# Patient Record
Sex: MALE | Race: BLACK OR AFRICAN AMERICAN | NOT HISPANIC OR LATINO | URBAN - METROPOLITAN AREA
[De-identification: names, ages, dates, MRNs, and addresses within clinical notes are randomized per-mention and may not be internally consistent; named-entity substitution may affect disease eponyms.]

---

## 2017-01-26 ENCOUNTER — OUTPATIENT (OUTPATIENT)
Dept: OUTPATIENT SERVICES | Facility: HOSPITAL | Age: 32
LOS: 1 days | Discharge: HOME | End: 2017-01-26

## 2017-06-27 DIAGNOSIS — E66.9 OBESITY, UNSPECIFIED: ICD-10-CM
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
## 2017-06-27 DIAGNOSIS — S93.324A DISLOCATION OF TARSOMETATARSAL JOINT OF RIGHT FOOT, INITIAL ENCOUNTER: ICD-10-CM
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
## 2017-06-27 DIAGNOSIS — I10 ESSENTIAL (PRIMARY) HYPERTENSION: ICD-10-CM
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
## 2018-06-01 ENCOUNTER — TRANSCRIPTION ENCOUNTER (OUTPATIENT)
Age: 33
End: 2018-06-01

## 2019-12-22 ENCOUNTER — TRANSCRIPTION ENCOUNTER (OUTPATIENT)
Age: 34
End: 2019-12-22

## 2022-08-11 PROBLEM — Z00.00 ENCOUNTER FOR PREVENTIVE HEALTH EXAMINATION: Status: ACTIVE | Noted: 2022-08-11

## 2022-09-20 ENCOUNTER — APPOINTMENT (OUTPATIENT)
Dept: ORTHOPEDIC SURGERY | Facility: CLINIC | Age: 37
End: 2022-09-20

## 2022-12-10 ENCOUNTER — EMERGENCY (EMERGENCY)
Facility: HOSPITAL | Age: 37
LOS: 0 days | Discharge: HOME | End: 2022-12-10
Attending: EMERGENCY MEDICINE | Admitting: EMERGENCY MEDICINE

## 2022-12-10 VITALS
RESPIRATION RATE: 18 BRPM | SYSTOLIC BLOOD PRESSURE: 152 MMHG | OXYGEN SATURATION: 99 % | HEART RATE: 72 BPM | DIASTOLIC BLOOD PRESSURE: 82 MMHG

## 2022-12-10 VITALS
SYSTOLIC BLOOD PRESSURE: 170 MMHG | OXYGEN SATURATION: 98 % | RESPIRATION RATE: 18 BRPM | TEMPERATURE: 98 F | HEART RATE: 85 BPM | WEIGHT: 251.11 LBS | DIASTOLIC BLOOD PRESSURE: 98 MMHG

## 2022-12-10 DIAGNOSIS — Z87.440 PERSONAL HISTORY OF URINARY (TRACT) INFECTIONS: ICD-10-CM

## 2022-12-10 DIAGNOSIS — R68.2 DRY MOUTH, UNSPECIFIED: ICD-10-CM

## 2022-12-10 PROCEDURE — 99282 EMERGENCY DEPT VISIT SF MDM: CPT

## 2022-12-10 RX ORDER — SALIVA SUBSTITUTE COMB NO.11 351 MG
1 POWDER IN PACKET (EA) MUCOUS MEMBRANE
Qty: 59 | Refills: 0
Start: 2022-12-10 | End: 2022-12-10

## 2022-12-10 NOTE — ED PROVIDER NOTE - NS ED ATTENDING STATEMENT MOD
This was a shared visit with the DUSTIN. I reviewed and verified the documentation and independently performed the documented:

## 2022-12-10 NOTE — ED PROVIDER NOTE - PHYSICAL EXAMINATION
VITAL SIGNS: I have reviewed nursing notes and confirm.  CONSTITUTIONAL: Patient is in no acute distress.  SKIN: Skin exam is warm and dry, no acute rash.  HEAD: Normocephalic; atraumatic.  EYES: PERRL, EOM intact; conjunctiva and sclera clear.  ENT: No nasal discharge; airway clear.   NECK: Supple; non tender.  CARD: S1, S2 normal; no murmurs, gallops, or rubs. Regular rate and rhythm.  RESP: Clear to auscultation bilaterally. No wheezes, rales or rhonchi.  ABD: Normal bowel sounds; soft; non-distended; non-tender.   EXT: Normal ROM. No edema.  LYMPH: No acute cervical adenopathy.  NEURO: Alert, oriented. Grossly unremarkable. No focal deficits.  PSYCH: Cooperative, appropriate. VITAL SIGNS: I have reviewed nursing notes and confirm.  CONSTITUTIONAL: Patient is in no acute distress.  SKIN: Skin exam is warm and dry, no acute rash.  HEAD: Normocephalic; atraumatic.  EYES: PERRL, EOM intact; conjunctiva and sclera clear.  ENT: +Dry, erythematous mucous membranes. No nasal discharge; airway clear.   NECK: Supple; non tender.  CARD: S1, S2 normal; no murmurs, gallops, or rubs. Regular rate and rhythm.  RESP: Clear to auscultation bilaterally. No wheezes, rales or rhonchi.  ABD: Normal bowel sounds; soft; non-distended; non-tender.   EXT: Normal ROM. No edema.  LYMPH: No acute cervical adenopathy.  NEURO: Alert, oriented. Grossly unremarkable. No focal deficits.  PSYCH: Cooperative, appropriate.

## 2022-12-10 NOTE — ED PROVIDER NOTE - NSFOLLOWUPINSTRUCTIONS_ED_ALL_ED_FT
Dry Mouth    AMBULATORY CARE:    Dry mouth, or xerostomia, is a lack of saliva (spit). Saliva helps protect your teeth from decay and your mouth from bacterial infection. Saliva also helps you chew, swallow, and digest food. Dry mouth happens when your saliva glands are not working properly. This causes a decrease in the amount of saliva your mouth produces.     Other common signs and symptoms:   •Dry, sticky mouth      •Thick or stringy saliva      •Scratchy, burning, or tingling feeling on your tongue      •Chapped, cracked lips or corners of your mouth      •Trouble talking, chewing, or swallowing      •Thirst or bad breath      •Hoarse voice or dry throat      •Sores on your mouth or tongue      •Change in taste      Seek care immediately if:   •You have trouble swallowing.      •Your mouth, face, or neck are swollen.      •You have trouble opening your mouth.      Call your doctor if:   •You have a fever.      •You have tooth pain.      •Your gums are irritated, painful, or bleed.      •Your symptoms do not get better, or they get worse.      •You have questions or concerns about your condition or care.      Treatment may include medicines to increase your saliva production. You may also need saliva substitutes to help keep your mouth moist. Your healthcare provider may change your medicine if it is causing your dry mouth.     Manage your symptoms:   •Drink liquids as directed. You may need to drink more water than usual. It may help to sip small amounts throughout the day. This will help keep your mouth moist. Do not drink caffeine or alcohol. Do not drink acidic juices such as tomato, orange, or grapefruit.      •Chew sugarless gum or suck on sugar-free candy. This will help increase saliva production.      •Rinse your mouth 4 times each day. Rinse after every meal. Use a mixture of salt and baking soda. Mix ½ teaspoon of salt and ½ teaspoon of baking soda in 1 cup of warm water. Swish in your mouth and spit out.       •Brush at least twice each day and floss your teeth. Use over-the-counter mouthrinses for dry mouth. Do not use mouthrinses that have alcohol.      •Use a cool mist humidifier. A humidifier will increase air moisture in your home. This may help moisten your mouth, especially at night.      •Do not smoke. Tobacco products can dry out your mouth. Do not use e-cigarettes or smokeless tobacco in place of cigarettes or to help you quit. They still contain nicotine. Ask your healthcare provider for information if you currently smoke and need help to quit.      Follow up with your doctor as directed: Write down your questions so you remember to ask them during your visits. You can take peptin gel 0.5% over the counter for dry mouth.     Dry Mouth    AMBULATORY CARE:    Dry mouth, or xerostomia, is a lack of saliva (spit). Saliva helps protect your teeth from decay and your mouth from bacterial infection. Saliva also helps you chew, swallow, and digest food. Dry mouth happens when your saliva glands are not working properly. This causes a decrease in the amount of saliva your mouth produces.     Other common signs and symptoms:   •Dry, sticky mouth      •Thick or stringy saliva      •Scratchy, burning, or tingling feeling on your tongue      •Chapped, cracked lips or corners of your mouth      •Trouble talking, chewing, or swallowing      •Thirst or bad breath      •Hoarse voice or dry throat      •Sores on your mouth or tongue      •Change in taste      Seek care immediately if:   •You have trouble swallowing.      •Your mouth, face, or neck are swollen.      •You have trouble opening your mouth.      Call your doctor if:   •You have a fever.      •You have tooth pain.      •Your gums are irritated, painful, or bleed.      •Your symptoms do not get better, or they get worse.      •You have questions or concerns about your condition or care.      Treatment may include medicines to increase your saliva production. You may also need saliva substitutes to help keep your mouth moist. Your healthcare provider may change your medicine if it is causing your dry mouth.     Manage your symptoms:   •Drink liquids as directed. You may need to drink more water than usual. It may help to sip small amounts throughout the day. This will help keep your mouth moist. Do not drink caffeine or alcohol. Do not drink acidic juices such as tomato, orange, or grapefruit.      •Chew sugarless gum or suck on sugar-free candy. This will help increase saliva production.      •Rinse your mouth 4 times each day. Rinse after every meal. Use a mixture of salt and baking soda. Mix ½ teaspoon of salt and ½ teaspoon of baking soda in 1 cup of warm water. Swish in your mouth and spit out.       •Brush at least twice each day and floss your teeth. Use over-the-counter mouthrinses for dry mouth. Do not use mouthrinses that have alcohol.      •Use a cool mist humidifier. A humidifier will increase air moisture in your home. This may help moisten your mouth, especially at night.      •Do not smoke. Tobacco products can dry out your mouth. Do not use e-cigarettes or smokeless tobacco in place of cigarettes or to help you quit. They still contain nicotine. Ask your healthcare provider for information if you currently smoke and need help to quit.      Follow up with your doctor as directed: Write down your questions so you remember to ask them during your visits. You can take pectin gel 0.5% over the counter for dry mouth.     Dry Mouth    AMBULATORY CARE:    Dry mouth, or xerostomia, is a lack of saliva (spit). Saliva helps protect your teeth from decay and your mouth from bacterial infection. Saliva also helps you chew, swallow, and digest food. Dry mouth happens when your saliva glands are not working properly. This causes a decrease in the amount of saliva your mouth produces.     Other common signs and symptoms:   •Dry, sticky mouth      •Thick or stringy saliva      •Scratchy, burning, or tingling feeling on your tongue      •Chapped, cracked lips or corners of your mouth      •Trouble talking, chewing, or swallowing      •Thirst or bad breath      •Hoarse voice or dry throat      •Sores on your mouth or tongue      •Change in taste      Seek care immediately if:   •You have trouble swallowing.      •Your mouth, face, or neck are swollen.      •You have trouble opening your mouth.      Call your doctor if:   •You have a fever.      •You have tooth pain.      •Your gums are irritated, painful, or bleed.      •Your symptoms do not get better, or they get worse.      •You have questions or concerns about your condition or care.      Treatment may include medicines to increase your saliva production. You may also need saliva substitutes to help keep your mouth moist. Your healthcare provider may change your medicine if it is causing your dry mouth.     Manage your symptoms:   •Drink liquids as directed. You may need to drink more water than usual. It may help to sip small amounts throughout the day. This will help keep your mouth moist. Do not drink caffeine or alcohol. Do not drink acidic juices such as tomato, orange, or grapefruit.      •Chew sugarless gum or suck on sugar-free candy. This will help increase saliva production.      •Rinse your mouth 4 times each day. Rinse after every meal. Use a mixture of salt and baking soda. Mix ½ teaspoon of salt and ½ teaspoon of baking soda in 1 cup of warm water. Swish in your mouth and spit out.       •Brush at least twice each day and floss your teeth. Use over-the-counter mouthrinses for dry mouth. Do not use mouthrinses that have alcohol.      •Use a cool mist humidifier. A humidifier will increase air moisture in your home. This may help moisten your mouth, especially at night.      •Do not smoke. Tobacco products can dry out your mouth. Do not use e-cigarettes or smokeless tobacco in place of cigarettes or to help you quit. They still contain nicotine. Ask your healthcare provider for information if you currently smoke and need help to quit.      Follow up with your doctor as directed: Write down your questions so you remember to ask them during your visits.

## 2022-12-10 NOTE — ED ADULT NURSE NOTE - OBJECTIVE STATEMENT
36 year old male, presenting to ED c/o dry mouth and sore throat x1 week.  Pt denies n/v/d/fevers/chills. Pt A&Ox4, ambulatory.

## 2022-12-10 NOTE — ED PROVIDER NOTE - CLINICAL SUMMARY MEDICAL DECISION MAKING FREE TEXT BOX
patient with dry mucous membranes likely secondary to persistent Flonase use, will try mouth kote and advised otc [facility] pectin gel, also to keep nares moist with emollients.  pt to f/u with ent

## 2022-12-10 NOTE — ED PROVIDER NOTE - ATTENDING APP SHARED VISIT CONTRIBUTION OF CARE
36-year-old male with no past medical history presents with complaint of dry mouth for 2 weeks.  Patient admits had recent URI with sinus issues for a few weeks 1 month ago.  Patient admits that he was taking daily Flonase and cefdinir for 2 to 3 weeks.  Patient says now he is having having persistent dry mouth and dry mucous membranes in his nose.  Patient was seen by ENT and was told that likely GERD related so given omeprazole however patient says that his symptoms are not improving.  Patient denies any chest pain or shortness of breath.  Patient has been using saline sprays over-the-counter without any relief.  Exam mucous membranes in bilateral naris are overall and mildly erythematous, dry, OP open and patent, without any significant swelling, mild erythema impression patient with dry mucous membranes likely secondary to persistent Flonase use, will try mouth kote and advised otc [facility]vs pectin gel, also to keep nares moist with emollients.  pt to f/u with ent

## 2022-12-10 NOTE — ED PROVIDER NOTE - PATIENT PORTAL LINK FT
You can access the FollowMyHealth Patient Portal offered by [facility] by registering at the following website: [email or web address]. By joining [employer]’s FollowMyHealth portal, you will also be able to view your health information using other applications (apps) compatible with our system.

## 2022-12-10 NOTE — ED ADULT TRIAGE NOTE - CHIEF COMPLAINT QUOTE
c/o dry mouth since a week . Patient was seen by ENT got treated for GERD . Tried many OTC medicines but no relief

## 2022-12-10 NOTE — ED PROVIDER NOTE - NS ED ROS FT
Review of Systems:  	•	CONSTITUTIONAL - no fever, no diaphoresis, no chills  	•	SKIN - no rash  	•	HEMATOLOGIC - no bleeding, no bruising  	•	EYES - no eye pain, no blurry vision  	•	ENT - +dry mouth, no congestion  	•	RESPIRATORY - no shortness of breath, no cough  	•	CARDIAC - no chest pain, no palpitations  	•	GI - no abd pain, no nausea, no vomiting, no diarrhea, no constipation  	•	GENITO-URINARY - no dysuria; no hematuria, no increased urinary frequency  	•	MUSCULOSKELETAL - no joint paint, no swelling, no redness  	•	NEUROLOGIC - no weakness, no headache, no paresthesias, no LOC  	•	PSYCH - no anxiety, no depression  	All other ROS are negative except as documented in HPI.

## 2022-12-10 NOTE — ED PROVIDER NOTE - OBJECTIVE STATEMENT
35 yo M with no pmhx presenting for evaluation of dry mouth last two weeks. Patient was taking cefednir and flonase for a uri about 2 weeks ago but now has persistent dry mouth. Has been taking otc meds without any relief. Was given omeprazole with minimal relief. No cp, sob, fever, chills, abdominal pain, nausea, vomiting, diarrhea, back pain, urinary symptoms, headache, dizziness, paresthesias, or weakness.